# Patient Record
Sex: MALE | Race: OTHER | HISPANIC OR LATINO | URBAN - METROPOLITAN AREA
[De-identification: names, ages, dates, MRNs, and addresses within clinical notes are randomized per-mention and may not be internally consistent; named-entity substitution may affect disease eponyms.]

---

## 2020-06-11 ENCOUNTER — OFFICE (OUTPATIENT)
Dept: URBAN - METROPOLITAN AREA TELEHEALTH 7 | Facility: TELEHEALTH | Age: 60
End: 2020-06-11

## 2020-06-11 VITALS — WEIGHT: 5 LBS

## 2020-06-11 DIAGNOSIS — R10.11 RIGHT UPPER QUADRANT PAIN: ICD-10-CM

## 2020-06-11 DIAGNOSIS — D62 ACUTE POSTHEMORRHAGIC ANEMIA: ICD-10-CM

## 2020-06-11 DIAGNOSIS — K62.5 HEMORRHAGE OF ANUS AND RECTUM: ICD-10-CM

## 2020-06-11 DIAGNOSIS — R63.4 ABNORMAL WEIGHT LOSS: ICD-10-CM

## 2020-06-11 DIAGNOSIS — R53.83 OTHER FATIGUE: ICD-10-CM

## 2020-06-11 PROCEDURE — 99204 OFFICE O/P NEW MOD 45 MIN: CPT | Mod: 95 | Performed by: PHYSICIAN ASSISTANT

## 2020-06-11 RX ORDER — POLYETHYLENE GLYCOL 3350 17 G/17G
POWDER, FOR SOLUTION ORAL
Qty: 1 | Refills: 0 | Status: ACTIVE
Start: 2020-06-11

## 2020-06-11 RX ORDER — IRON,CARBONYL/ASCORBIC ACID 65MG-125MG
TABLET, DELAYED RELEASE (ENTERIC COATED) ORAL
Qty: 60 | Refills: 3 | Status: ACTIVE
Start: 2020-06-11

## 2020-06-11 RX ORDER — OMEPRAZOLE 40 MG/1
CAPSULE, DELAYED RELEASE ORAL
Qty: 90 | Refills: 3 | Status: ACTIVE
Start: 2020-06-11

## 2020-06-11 NOTE — SERVICENOTES
I have reviewed the history, physical exam, assessment and management plans.  I concur with or have edited all elements of her note., Patient's visit was conducted through [facility] [employer] [facility] telecommunication. Patient consented before the start of visit as to understanding of privacy concerns, possible technological failure, and their responsibility of carrying out instructions of plan.

## 2020-06-11 NOTE — SERVICEHPINOTES
PATIENT VERIFIED BY DATE OF BIRTH AND NAME. Patient has been consented for this telecommunication visit. Reviewed PmHx, FmHX, SHx. Mr. Jesus Greene is a 59 yo male that presents for new patient visit concerning new anemia, rectal bleeding, weight loss. He recalls BRBPR seen on wipe and in stool began x 3 months ago. Per wife, he has soiled thru his underwear with BRBPR that she found while doing laundry. She is very concerned about the patient given his weight loss #20 lbs over the past month. He reports upper abdominal pain the localizes to the RUQ and spreads down to the RLQ, described as "sharp," lasting several hours in duration, and interferes with sleep. He has daily BMs with constipation manifesting with hard stools and straining. No trial of Miralax, stool softeners, or suppositories. Given all these symptoms, he was recently seen at PCP office in early June where his 06/04/2020 labs found evidence of new anemia: mcv 65, hgb 8.1, hct 31 (No iron panel checked). Fm h/o anemia in mother (unsure of cause). No known fm h/o CRC or IBD. No personal h/o cardiac or pulmonary disease. Rare NSAID use. Denies n/v, dysphagia, heartburn, regurgitation, melena, diarrhea. Review of systems performed (see below), otherwise negative for all other non-GI complaints.Review of last PCP office visit in 06/2020: H/o BPH and chronic back pain (h/o spinal fusion L4-L5 in 2018).BR

## 2020-06-25 ENCOUNTER — ON CAMPUS - OUTPATIENT (OUTPATIENT)
Dept: URBAN - METROPOLITAN AREA HOSPITAL 16 | Facility: HOSPITAL | Age: 60
End: 2020-06-25
Payer: MEDICAID

## 2020-06-25 DIAGNOSIS — K62.5 HEMORRHAGE OF ANUS AND RECTUM: ICD-10-CM

## 2020-06-25 DIAGNOSIS — R53.83 OTHER FATIGUE: ICD-10-CM

## 2020-06-25 DIAGNOSIS — R10.11 RIGHT UPPER QUADRANT PAIN: ICD-10-CM

## 2020-06-25 DIAGNOSIS — R63.4 ABNORMAL WEIGHT LOSS: ICD-10-CM

## 2020-06-25 DIAGNOSIS — D62 ACUTE POSTHEMORRHAGIC ANEMIA: ICD-10-CM

## 2020-06-25 PROCEDURE — 45378 DIAGNOSTIC COLONOSCOPY: CPT | Performed by: INTERNAL MEDICINE

## 2020-07-09 ENCOUNTER — OFFICE (OUTPATIENT)
Dept: URBAN - METROPOLITAN AREA TELEHEALTH 7 | Facility: TELEHEALTH | Age: 60
End: 2020-07-09
Payer: MEDICAID

## 2020-07-09 VITALS — HEIGHT: 68 IN | WEIGHT: 140 LBS

## 2020-07-09 DIAGNOSIS — D62 ACUTE POSTHEMORRHAGIC ANEMIA: ICD-10-CM

## 2020-07-09 PROCEDURE — 99442: CPT | Performed by: PHYSICIAN ASSISTANT

## 2020-07-09 RX ORDER — DOCUSATE SODIUM 100 MG/1
CAPSULE, LIQUID FILLED ORAL
Qty: 60 | Refills: 3 | Status: ACTIVE

## 2020-07-09 RX ORDER — IRON,CARBONYL/ASCORBIC ACID 65MG-125MG
TABLET, DELAYED RELEASE (ENTERIC COATED) ORAL
Qty: 60 | Refills: 3 | Status: ACTIVE
Start: 2020-06-11

## 2020-07-09 NOTE — SERVICEHPINOTES
PATIENT VERIFIED BY DATE OF BIRTH AND NAME. Patient has been consented for this telecommunication visit. Reviewed PmHx, FmHX, SHx. Mr. Jesus Greene is a 61 yo male that presents for f/u visit to EGD-Colonoscopy that was ordered to evaluate new onset iron def anemia. His 06/25/2020 procedures were normal with benign biopsies. He reports feeling better given more strength and gaining back the weight he initially lost. Also normal CT scan abdomen/pelvis that was done prior to EGD/colon. He states no longer having rectal bleeding at this time. He has daily BMs with mild constipation that he attributes to po iron and improves with stool softener use prn. No known fm h/o CRC or IBD. No personal h/o cardiac or pulmonary disease. Rare NSAID use. Denies n/v, dysphagia, heartburn, regurgitation, melena, diarrhea, further weight loss. Review of systems performed (see below), otherwise negative for all other non-GI complaints.Review of last PCP office visit in 06/2020: H/o BPH and chronic back pain (h/o spinal fusion L4-L5 in 2018)BR

## 2020-08-21 ENCOUNTER — OFFICE (OUTPATIENT)
Dept: URBAN - METROPOLITAN AREA CLINIC 34 | Facility: CLINIC | Age: 60
End: 2020-08-21
Payer: MEDICAID

## 2020-08-21 DIAGNOSIS — D50.9 IRON DEFICIENCY ANEMIA, UNSPECIFIED: ICD-10-CM

## 2020-08-21 PROCEDURE — 91110 GI TRC IMG INTRAL ESOPH-ILE: CPT | Performed by: INTERNAL MEDICINE
